# Patient Record
Sex: FEMALE | URBAN - METROPOLITAN AREA
[De-identification: names, ages, dates, MRNs, and addresses within clinical notes are randomized per-mention and may not be internally consistent; named-entity substitution may affect disease eponyms.]

---

## 2019-12-09 ENCOUNTER — HOSPITAL ENCOUNTER (OUTPATIENT)
Dept: RADIOLOGY | Facility: MEDICAL CENTER | Age: 64
End: 2019-12-09

## 2019-12-16 ENCOUNTER — TELEMEDICINE2 (OUTPATIENT)
Dept: PULMONOLOGY | Facility: HOSPICE | Age: 64
End: 2019-12-16
Payer: MEDICARE

## 2019-12-16 VITALS
TEMPERATURE: 98.7 F | DIASTOLIC BLOOD PRESSURE: 94 MMHG | SYSTOLIC BLOOD PRESSURE: 115 MMHG | HEIGHT: 61 IN | BODY MASS INDEX: 12.84 KG/M2 | HEART RATE: 74 BPM | WEIGHT: 68 LBS | RESPIRATION RATE: 16 BRPM | OXYGEN SATURATION: 98 %

## 2019-12-16 DIAGNOSIS — J18.9 PNEUMONIA OF RIGHT MIDDLE LOBE DUE TO INFECTIOUS ORGANISM: ICD-10-CM

## 2019-12-16 DIAGNOSIS — C34.91 MALIGNANT NEOPLASM OF RIGHT LUNG, UNSPECIFIED PART OF LUNG (HCC): ICD-10-CM

## 2019-12-16 DIAGNOSIS — R09.02 HYPOXIA: ICD-10-CM

## 2019-12-16 DIAGNOSIS — J44.9 CHRONIC OBSTRUCTIVE PULMONARY DISEASE, UNSPECIFIED COPD TYPE (HCC): ICD-10-CM

## 2019-12-16 PROCEDURE — 99204 OFFICE O/P NEW MOD 45 MIN: CPT | Performed by: INTERNAL MEDICINE

## 2019-12-16 RX ORDER — DIVALPROEX SODIUM 500 MG/1
500 TABLET, DELAYED RELEASE ORAL 3 TIMES DAILY
COMMUNITY

## 2019-12-16 NOTE — PATIENT INSTRUCTIONS
Please note that patient was evaluated by telemedicine at a remote site.  All examination and evaluation of the patient and information gathering were done jointly by the requesting physician at the site, and medical assistant, via licensed and securely encrypted tele-video communication with the assistance of a trained tele-presenter at the originating site.  As such my assessments and recommendations are limited as far as such telecommunication allows.  The patient is located in [facility], and the medical assistant helping with the physical examination and history is Haleigh.    This lady is evaluated by telemedicine in [place], referred by her primary care nurse practitioner, Mary Gallagher, because of severe COPD, hypoxemia and recent hospitalization with pneumonia.    She has a history in 2008 of having right lung cancer removed, she indicates one third of her lung was taken out.  I reviewed her x-ray that was available done in December of this year and it does show the right lung surgery volume loss and chronic changes are noted by my review with kyphoscoliosis, hyperinflation, but no suspicion of new malignancy or acute process.  Pneumonia clinically resolved.    Besides the lung cancer, smoking history is noted and she has cut down to 1/2 pack/day, this is strongly counseled and encouraged.  When she gets nervous or anxious she smokes but is trying to stop.    COPD was treated with Advair and albuterol, but Advair gives her a bad taste in her mouth and she has oral thrush by medical assistant exam, I would suggest she change to Stiolto or Anoro and avoid the inhaled steroid.  Albuterol rescue is used rarely.  Oxygen nighttime and with exercise.    She does have musculoskeletal chest pain, does have chronic back pain, but if that worsens she should go to urgent care or a local facility as she lives in [place].    I would suggest changing her to a nonsteroid long-acting inhaler such as Anoro or Stiolto, continue  oxygen at night and with daytime activity, stop smoking, flu vaccine and pneumonia vaccine are current, and finally periodic follow-up in her local area is needed, we would be happy to see her again if new problems arise or if imaging abnormalities are discovered.  We appreciate assisting in her care

## 2019-12-16 NOTE — PROGRESS NOTES
Silvia Hdz is a 64 y.o. female here for COPD with recent hospitalization and hypoxemia with lung cancer and prior right lung resectional surgery. Patient was referred by her primary care specialist in [place].    History of Present Illness:      Please note that patient was evaluated by telemedicine at a remote site.  All examination and evaluation of the patient and information gathering were done jointly by the requesting physician at the site, and medical assistant, via licensed and securely encrypted tele-video communication with the assistance of a trained tele-presenter at the originating site.  As such my assessments and recommendations are limited as far as such telecommunication allows.  The patient is located in [facility], and the medical assistant helping with the physical examination and history is Haleigh.    This lady is evaluated by telemedicine in [place], referred by her primary care nurse practitioner, Mary Gallagher, because of severe COPD, hypoxemia and recent hospitalization with pneumonia.    She has a history in 2008 of having right lung cancer removed, she indicates one third of her lung was taken out.  I reviewed her x-ray that was available done in December of this year and it does show the right lung surgery volume loss and chronic changes are noted by my review with kyphoscoliosis, hyperinflation, but no suspicion of new malignancy or acute process.  Pneumonia clinically resolved.    Besides the lung cancer, smoking history is noted and she has cut down to 1/2 pack/day, this is strongly counseled and encouraged.  When she gets nervous or anxious she smokes but is trying to stop.    COPD was treated with Advair and albuterol, but Advair gives her a bad taste in her mouth and she has oral thrush by medical assistant exam, I would suggest she change to Stiolto or Anoro and avoid the inhaled steroid.  Albuterol rescue is used rarely.  Oxygen nighttime and with exercise.    She does have  musculoskeletal chest pain, does have chronic back pain, but if that worsens she should go to urgent care or a local facility as she lives in [place].    I would suggest changing her to a nonsteroid long-acting inhaler such as Anoro or Stiolto, continue oxygen at night and with daytime activity, stop smoking, flu vaccine and pneumonia vaccine are current, and finally periodic follow-up in her local area is needed, we would be happy to see her again if new problems arise or if imaging abnormalities are discovered.  We appreciate assisting in her care    Constitutional ROS: Markedly reduced weight with body mass index only 13, urged to eat what ever appeals to her  Eyes: No change in vision or blurring or double vision  Mouth/Throat ROS: No sore throat, No recent change in voice or hoarseness; complains of bad taste in her mouth from Advair, with oral thrush will change to nonsteroid inhaler such as Anoro or Stiolto  Pulmonary ROS: See present history for pertinent positives  Cardiovascular ROS: No chest pain to suggest acute coronary syndrome; does have chest wall musculoskeletal discomfort, advised to seek local attention if worsens  Gastrointestinal ROS: No abdominal pain to suggest peptic disease; poor appetite  Musculoskeletal/Extremities ROS: no acute artritis or unusual swelling  Hematologic/Lymphatic ROS: No easy bleeding or unusual lymph node swelling  Neurologic ROS: No new or unusual weakness  Psychiatric ROS: No hallucinations  Allergic/Immunologic: No  urticaria or allergic rash      Current Outpatient Medications   Medication Sig Dispense Refill   • divalproex (DEPAKOTE) 500 MG Tablet Delayed Response Take 500 mg by mouth 3 times a day.     • carBAMazepine (TEGRETOL PO) Take  by mouth.       No current facility-administered medications for this visit.        Social History     Tobacco Use   • Smoking status: Current Every Day Smoker     Packs/day: 1.00     Types: Cigarettes   • Smokeless tobacco: Never Used  "  Substance Use Topics   • Alcohol use: Not Currently   • Drug use: Never        Past Medical History:   Diagnosis Date   • Cough    • Painful breathing    • Shortness of breath    • Wheezing        History reviewed. No pertinent surgical history.    Allergies: Patient has no allergy information on record.    History reviewed. No pertinent family history.    Physical Examination    Vitals:    12/16/19 1030   Height: 1.549 m (5' 1\")   Weight: 30.8 kg (68 lb)   Weight % change since last entry.: 0 %   BP: 115/94   Pulse: 74   BMI (Calculated): 12.85   Resp: 16   Temp: 37.1 °C (98.7 °F)       General Appearance: alert, no distress  Skin: Skin color, texture, turgor normal. No rashes or lesions.  Eyes: negative  Oropharynx: Lips, mucosa, and tongue normal. Teeth and gums normal. Oropharynx moist and without lesion  Lungs: positive findings: By telemedicine exam remarkably quiet and clear  Heart: negative. RRR without murmur, gallop, or rubs.  No ectopy.  Abdomen: Abdomen soft, non-tender. . No masses,  No organomegaly  Extremities:  No deformities, edema, or skin discoloration; she indicates intermittent ankle swelling but none evident today by medical assistant exam  Joints: No acute arthritis  Peripheral Pulses:perfused  Neurologic: intact grossly  Oral thrush by medical assistant exam, advised to use nystatin swish and swallow and stop Advair      Imaging: Recent chest x-ray done December 9, 2019 shows prior right lung surgery, surgical clip evident, volume loss on the right side with kyphoscoliosis and hyperinflation, chronic changes identified by the radiologist as well as my personal review    PFTS: None presently      Assessment and Plan  1. Chronic obstructive pulmonary disease, unspecified COPD type (HCC)  Stop Advair, utilize Anoro or Stiolto see discussion above    2. Hypoxia  Oxygen nighttime and daytime with activity    3. Malignant neoplasm of right lung, unspecified part of lung (HCC)  Remote " surgery    4. Pneumonia of right middle lobe due to infectious organism (HCC)  Recently hospitalized in [place]      Followup Return if symptoms worsen or fail to improve.

## 2021-04-10 ENCOUNTER — HOSPITAL ENCOUNTER (INPATIENT)
Dept: HOSPITAL 8 - ED | Age: 66
LOS: 3 days | Discharge: NURSING, REHAB OR LONG-TERM CARE | DRG: 480 | End: 2021-04-13
Attending: INTERNAL MEDICINE | Admitting: FAMILY MEDICINE
Payer: MEDICARE

## 2021-04-10 VITALS — DIASTOLIC BLOOD PRESSURE: 83 MMHG | SYSTOLIC BLOOD PRESSURE: 145 MMHG

## 2021-04-10 VITALS — DIASTOLIC BLOOD PRESSURE: 63 MMHG | SYSTOLIC BLOOD PRESSURE: 108 MMHG

## 2021-04-10 VITALS — SYSTOLIC BLOOD PRESSURE: 109 MMHG | DIASTOLIC BLOOD PRESSURE: 58 MMHG

## 2021-04-10 VITALS — BODY MASS INDEX: 13.82 KG/M2 | WEIGHT: 73.19 LBS | HEIGHT: 61 IN

## 2021-04-10 DIAGNOSIS — J96.11: ICD-10-CM

## 2021-04-10 DIAGNOSIS — I25.10: ICD-10-CM

## 2021-04-10 DIAGNOSIS — Z99.81: ICD-10-CM

## 2021-04-10 DIAGNOSIS — Z20.822: ICD-10-CM

## 2021-04-10 DIAGNOSIS — Z79.82: ICD-10-CM

## 2021-04-10 DIAGNOSIS — Z86.711: ICD-10-CM
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
## 2021-04-10 DIAGNOSIS — S72.002A: Primary | ICD-10-CM

## 2021-04-10 DIAGNOSIS — Z88.8: ICD-10-CM

## 2021-04-10 DIAGNOSIS — I11.0: ICD-10-CM
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
## 2021-04-10 DIAGNOSIS — Z88.0: ICD-10-CM

## 2021-04-10 DIAGNOSIS — Z90.710: ICD-10-CM

## 2021-04-10 DIAGNOSIS — Z95.5: ICD-10-CM

## 2021-04-10 DIAGNOSIS — E43: ICD-10-CM

## 2021-04-10 DIAGNOSIS — I25.2: ICD-10-CM

## 2021-04-10 DIAGNOSIS — G25.81: ICD-10-CM
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
## 2021-04-10 DIAGNOSIS — G40.909: ICD-10-CM

## 2021-04-10 DIAGNOSIS — I50.9: ICD-10-CM

## 2021-04-10 DIAGNOSIS — J43.9: ICD-10-CM

## 2021-04-10 PROCEDURE — 71045 X-RAY EXAM CHEST 1 VIEW: CPT

## 2021-04-10 PROCEDURE — 96375 TX/PRO/DX INJ NEW DRUG ADDON: CPT

## 2021-04-10 PROCEDURE — 76000 FLUOROSCOPY <1 HR PHYS/QHP: CPT

## 2021-04-10 PROCEDURE — 83735 ASSAY OF MAGNESIUM: CPT

## 2021-04-10 PROCEDURE — C1713 ANCHOR/SCREW BN/BN,TIS/BN: HCPCS

## 2021-04-10 PROCEDURE — 0QS736Z REPOSITION LEFT UPPER FEMUR WITH INTRAMEDULLARY INTERNAL FIXATION DEVICE, PERCUTANEOUS APPROACH: ICD-10-PCS | Performed by: STUDENT IN AN ORGANIZED HEALTH CARE EDUCATION/TRAINING PROGRAM

## 2021-04-10 PROCEDURE — 36415 COLL VENOUS BLD VENIPUNCTURE: CPT

## 2021-04-10 PROCEDURE — 93005 ELECTROCARDIOGRAM TRACING: CPT

## 2021-04-10 PROCEDURE — 85025 COMPLETE CBC W/AUTO DIFF WBC: CPT

## 2021-04-10 PROCEDURE — 80048 BASIC METABOLIC PNL TOTAL CA: CPT

## 2021-04-10 PROCEDURE — 94640 AIRWAY INHALATION TREATMENT: CPT

## 2021-04-10 PROCEDURE — 87635 SARS-COV-2 COVID-19 AMP PRB: CPT

## 2021-04-10 PROCEDURE — 96374 THER/PROPH/DIAG INJ IV PUSH: CPT

## 2021-04-10 PROCEDURE — 82040 ASSAY OF SERUM ALBUMIN: CPT

## 2021-04-10 PROCEDURE — 84100 ASSAY OF PHOSPHORUS: CPT

## 2021-04-10 PROCEDURE — C1769 GUIDE WIRE: HCPCS

## 2021-04-10 RX ADMIN — SODIUM CHLORIDE, SODIUM LACTATE, POTASSIUM CHLORIDE, AND CALCIUM CHLORIDE SCH MLS/HR: .6; .31; .03; .02 INJECTION, SOLUTION INTRAVENOUS at 13:00

## 2021-04-10 RX ADMIN — HYDROMORPHONE HYDROCHLORIDE PRN MG: 1 INJECTION, SOLUTION INTRAMUSCULAR; INTRAVENOUS; SUBCUTANEOUS at 07:04

## 2021-04-10 RX ADMIN — HYDROMORPHONE HYDROCHLORIDE PRN MG: 1 INJECTION, SOLUTION INTRAMUSCULAR; INTRAVENOUS; SUBCUTANEOUS at 07:37

## 2021-04-10 RX ADMIN — FLUTICASONE FUROATE AND VILANTEROL TRIFENATATE SCH PUFF: 100; 25 POWDER RESPIRATORY (INHALATION) at 09:00

## 2021-04-10 RX ADMIN — SODIUM CHLORIDE, SODIUM LACTATE, POTASSIUM CHLORIDE, AND CALCIUM CHLORIDE SCH MLS/HR: .6; .31; .03; .02 INJECTION, SOLUTION INTRAVENOUS at 21:45

## 2021-04-10 RX ADMIN — LISINOPRIL SCH MG: 5 TABLET ORAL at 09:00

## 2021-04-10 RX ADMIN — ASPIRIN SCH MG: 81 TABLET, COATED ORAL at 08:30

## 2021-04-10 RX ADMIN — ATORVASTATIN CALCIUM SCH MG: 40 TABLET, FILM COATED ORAL at 20:19

## 2021-04-10 RX ADMIN — MORPHINE SULFATE PRN MG: 10 INJECTION INTRAVENOUS at 12:31

## 2021-04-10 RX ADMIN — EZETIMIBE SCH MG: 10 TABLET ORAL at 20:19

## 2021-04-10 RX ADMIN — METOPROLOL TARTRATE SCH MG: 25 TABLET, FILM COATED ORAL at 09:00

## 2021-04-10 RX ADMIN — METOPROLOL TARTRATE SCH MG: 25 TABLET, FILM COATED ORAL at 20:20

## 2021-04-10 RX ADMIN — ACETAMINOPHEN PRN MG: 325 TABLET, FILM COATED ORAL at 18:45

## 2021-04-10 RX ADMIN — MORPHINE SULFATE PRN MG: 10 INJECTION INTRAVENOUS at 15:55

## 2021-04-10 RX ADMIN — SPIRONOLACTONE SCH MG: 25 TABLET ORAL at 20:20

## 2021-04-10 RX ADMIN — CARBAMAZEPINE SCH MG: 200 TABLET ORAL at 20:20

## 2021-04-10 RX ADMIN — DOCUSATE SODIUM 50MG AND SENNOSIDES 8.6MG SCH TAB: 8.6; 5 TABLET, FILM COATED ORAL at 09:00

## 2021-04-10 RX ADMIN — DIVALPROEX SODIUM SCH MG: 500 TABLET, DELAYED RELEASE ORAL at 20:19

## 2021-04-10 NOTE — NUR
INITIAL PT CONTACT. PT PRESENTS TO ED FROM [place] AS TRANSFER PT FOR FX OF THE 
LEFT FEMORAL NECK, TRANSFER FOR ORTHO TX. PT STATES SHE FELL LAST NIGHT AND 
EXPERIENCED IMMEDIATE LEFT HIP PAIN. NO LOSS OF CONCIOUSNESS OR NECK PAIN. [facility] 
DISTAL TO INJURY INTACT. PT TRANSFERED FROM EMS [facility] TO ED [facility], 
REPOSITIONED PER REQUEST. PT DENIES ANY ADDITIONAL NEEDS AT THIS TIME. CALL 
LIGHT AND PERSONAL BELONGINGS WITHIN REACH. ERP AT BEDSIDE. WILL MEDICATE PER 
EMAR.

## 2021-04-11 VITALS — DIASTOLIC BLOOD PRESSURE: 60 MMHG | SYSTOLIC BLOOD PRESSURE: 96 MMHG

## 2021-04-11 VITALS — SYSTOLIC BLOOD PRESSURE: 102 MMHG | DIASTOLIC BLOOD PRESSURE: 61 MMHG

## 2021-04-11 VITALS — SYSTOLIC BLOOD PRESSURE: 101 MMHG | DIASTOLIC BLOOD PRESSURE: 61 MMHG

## 2021-04-11 VITALS — DIASTOLIC BLOOD PRESSURE: 66 MMHG | SYSTOLIC BLOOD PRESSURE: 107 MMHG

## 2021-04-11 VITALS — SYSTOLIC BLOOD PRESSURE: 105 MMHG | DIASTOLIC BLOOD PRESSURE: 56 MMHG

## 2021-04-11 LAB
ALBUMIN SERPL-MCNC: 2.3 G/DL (ref 3.4–5)
ANION GAP SERPL CALC-SCNC: 4 MMOL/L (ref 5–15)
BASOPHILS # BLD AUTO: 0 X10^3/UL (ref 0–0.1)
BASOPHILS NFR BLD AUTO: 0 % (ref 0–1)
CALCIUM SERPL-MCNC: 7.8 MG/DL (ref 8.5–10.1)
CHLORIDE SERPL-SCNC: 106 MMOL/L (ref 98–107)
CREAT SERPL-MCNC: 0.62 MG/DL (ref 0.55–1.02)
EOSINOPHIL # BLD AUTO: 0.1 X10^3/UL (ref 0–0.4)
EOSINOPHIL NFR BLD AUTO: 1 % (ref 1–7)
ERYTHROCYTE [DISTWIDTH] IN BLOOD BY AUTOMATED COUNT: 15.4 % (ref 9.6–15.2)
LYMPHOCYTES # BLD AUTO: 1.7 X10^3/UL (ref 1–3.4)
LYMPHOCYTES NFR BLD AUTO: 20 % (ref 22–44)
MCH RBC QN AUTO: 25.4 PG (ref 27–34.8)
MCHC RBC AUTO-ENTMCNC: 31.6 G/DL (ref 32.4–35.8)
MD: NO
MONOCYTES # BLD AUTO: 0.9 X10^3/UL (ref 0.2–0.8)
MONOCYTES NFR BLD AUTO: 10 % (ref 2–9)
NEUTROPHILS # BLD AUTO: 6.1 X10^3/UL (ref 1.8–6.8)
NEUTROPHILS NFR BLD AUTO: 69 % (ref 42–75)
PLATELET # BLD AUTO: 189 X10^3/UL (ref 130–400)
PMV BLD AUTO: 7 FL (ref 7.4–10.4)
RBC # BLD AUTO: 3.23 X10^6/UL (ref 3.82–5.3)

## 2021-04-11 RX ADMIN — LISINOPRIL SCH MG: 5 TABLET ORAL at 09:04

## 2021-04-11 RX ADMIN — METOPROLOL TARTRATE SCH MG: 25 TABLET, FILM COATED ORAL at 20:55

## 2021-04-11 RX ADMIN — HEPARIN SODIUM SCH UNITS: 5000 INJECTION, SOLUTION INTRAVENOUS; SUBCUTANEOUS at 16:50

## 2021-04-11 RX ADMIN — CARBAMAZEPINE SCH MG: 200 TABLET ORAL at 20:56

## 2021-04-11 RX ADMIN — Medication SCH TAB: at 16:50

## 2021-04-11 RX ADMIN — OXYCODONE HYDROCHLORIDE AND ACETAMINOPHEN SCH MG: 500 TABLET ORAL at 16:49

## 2021-04-11 RX ADMIN — HYDROCODONE BITARTRATE AND ACETAMINOPHEN PRN TAB: 5; 325 TABLET ORAL at 06:33

## 2021-04-11 RX ADMIN — DIVALPROEX SODIUM SCH MG: 500 TABLET, DELAYED RELEASE ORAL at 09:05

## 2021-04-11 RX ADMIN — MORPHINE SULFATE PRN MG: 10 INJECTION INTRAVENOUS at 23:44

## 2021-04-11 RX ADMIN — FLUTICASONE FUROATE AND VILANTEROL TRIFENATATE SCH PUFF: 100; 25 POWDER RESPIRATORY (INHALATION) at 09:12

## 2021-04-11 RX ADMIN — HYDROCODONE BITARTRATE AND ACETAMINOPHEN PRN TAB: 5; 325 TABLET ORAL at 13:04

## 2021-04-11 RX ADMIN — TICAGRELOR SCH MG: 90 TABLET ORAL at 20:55

## 2021-04-11 RX ADMIN — MORPHINE SULFATE PRN MG: 10 INJECTION INTRAVENOUS at 00:50

## 2021-04-11 RX ADMIN — CARBAMAZEPINE SCH MG: 200 TABLET ORAL at 09:06

## 2021-04-11 RX ADMIN — FLUTICASONE FUROATE AND VILANTEROL TRIFENATATE SCH PUFF: 100; 25 POWDER RESPIRATORY (INHALATION) at 08:20

## 2021-04-11 RX ADMIN — METOPROLOL TARTRATE SCH MG: 25 TABLET, FILM COATED ORAL at 09:00

## 2021-04-11 RX ADMIN — EZETIMIBE SCH MG: 10 TABLET ORAL at 20:55

## 2021-04-11 RX ADMIN — DOCUSATE SODIUM 50MG AND SENNOSIDES 8.6MG SCH TAB: 8.6; 5 TABLET, FILM COATED ORAL at 09:05

## 2021-04-11 RX ADMIN — HYDROCODONE BITARTRATE AND ACETAMINOPHEN PRN TAB: 5; 325 TABLET ORAL at 18:49

## 2021-04-11 RX ADMIN — ASPIRIN SCH MG: 81 TABLET, COATED ORAL at 06:33

## 2021-04-11 RX ADMIN — SPIRONOLACTONE SCH MG: 25 TABLET ORAL at 20:56

## 2021-04-11 RX ADMIN — ATORVASTATIN CALCIUM SCH MG: 40 TABLET, FILM COATED ORAL at 20:55

## 2021-04-11 RX ADMIN — HEPARIN SODIUM SCH UNITS: 5000 INJECTION, SOLUTION INTRAVENOUS; SUBCUTANEOUS at 09:09

## 2021-04-11 RX ADMIN — DIVALPROEX SODIUM SCH MG: 500 TABLET, DELAYED RELEASE ORAL at 20:56

## 2021-04-11 RX ADMIN — CEFAZOLIN SODIUM SCH MLS/HR: 1 SOLUTION INTRAVENOUS at 16:50

## 2021-04-12 VITALS — DIASTOLIC BLOOD PRESSURE: 68 MMHG | SYSTOLIC BLOOD PRESSURE: 108 MMHG

## 2021-04-12 VITALS — DIASTOLIC BLOOD PRESSURE: 61 MMHG | SYSTOLIC BLOOD PRESSURE: 101 MMHG

## 2021-04-12 VITALS — SYSTOLIC BLOOD PRESSURE: 114 MMHG | DIASTOLIC BLOOD PRESSURE: 68 MMHG

## 2021-04-12 VITALS — DIASTOLIC BLOOD PRESSURE: 59 MMHG | SYSTOLIC BLOOD PRESSURE: 105 MMHG

## 2021-04-12 VITALS — DIASTOLIC BLOOD PRESSURE: 71 MMHG | SYSTOLIC BLOOD PRESSURE: 112 MMHG

## 2021-04-12 VITALS — SYSTOLIC BLOOD PRESSURE: 100 MMHG | DIASTOLIC BLOOD PRESSURE: 57 MMHG

## 2021-04-12 LAB
ANION GAP SERPL CALC-SCNC: 3 MMOL/L (ref 5–15)
BASOPHILS # BLD AUTO: 0 X10^3/UL (ref 0–0.1)
BASOPHILS NFR BLD AUTO: 1 % (ref 0–1)
CALCIUM SERPL-MCNC: 8.3 MG/DL (ref 8.5–10.1)
CHLORIDE SERPL-SCNC: 106 MMOL/L (ref 98–107)
CREAT SERPL-MCNC: 0.39 MG/DL (ref 0.55–1.02)
EOSINOPHIL # BLD AUTO: 0.1 X10^3/UL (ref 0–0.4)
EOSINOPHIL NFR BLD AUTO: 1 % (ref 1–7)
ERYTHROCYTE [DISTWIDTH] IN BLOOD BY AUTOMATED COUNT: 15.9 % (ref 9.6–15.2)
LYMPHOCYTES # BLD AUTO: 1.4 X10^3/UL (ref 1–3.4)
LYMPHOCYTES NFR BLD AUTO: 17 % (ref 22–44)
MCH RBC QN AUTO: 25.4 PG (ref 27–34.8)
MCHC RBC AUTO-ENTMCNC: 31.9 G/DL (ref 32.4–35.8)
MD: NO
MONOCYTES # BLD AUTO: 0.6 X10^3/UL (ref 0.2–0.8)
MONOCYTES NFR BLD AUTO: 7 % (ref 2–9)
NEUTROPHILS # BLD AUTO: 6.3 X10^3/UL (ref 1.8–6.8)
NEUTROPHILS NFR BLD AUTO: 74 % (ref 42–75)
PLATELET # BLD AUTO: 187 X10^3/UL (ref 130–400)
PMV BLD AUTO: 7.6 FL (ref 7.4–10.4)
RBC # BLD AUTO: 3.02 X10^6/UL (ref 3.82–5.3)

## 2021-04-12 RX ADMIN — HEPARIN SODIUM SCH UNITS: 5000 INJECTION, SOLUTION INTRAVENOUS; SUBCUTANEOUS at 14:52

## 2021-04-12 RX ADMIN — DIVALPROEX SODIUM SCH MG: 500 TABLET, DELAYED RELEASE ORAL at 08:51

## 2021-04-12 RX ADMIN — DULOXETINE HYDROCHLORIDE SCH MG: 30 CAPSULE, DELAYED RELEASE ORAL at 08:25

## 2021-04-12 RX ADMIN — HYDROCODONE BITARTRATE AND ACETAMINOPHEN PRN TAB: 5; 325 TABLET ORAL at 06:07

## 2021-04-12 RX ADMIN — TICAGRELOR SCH MG: 90 TABLET ORAL at 21:15

## 2021-04-12 RX ADMIN — SPIRONOLACTONE SCH MG: 25 TABLET ORAL at 21:15

## 2021-04-12 RX ADMIN — ASPIRIN SCH MG: 81 TABLET, COATED ORAL at 06:06

## 2021-04-12 RX ADMIN — HEPARIN SODIUM SCH UNITS: 5000 INJECTION, SOLUTION INTRAVENOUS; SUBCUTANEOUS at 21:18

## 2021-04-12 RX ADMIN — OXYCODONE HYDROCHLORIDE AND ACETAMINOPHEN SCH MG: 500 TABLET ORAL at 18:05

## 2021-04-12 RX ADMIN — ATORVASTATIN CALCIUM SCH MG: 40 TABLET, FILM COATED ORAL at 21:15

## 2021-04-12 RX ADMIN — ACETAMINOPHEN PRN MG: 325 TABLET, FILM COATED ORAL at 13:02

## 2021-04-12 RX ADMIN — TICAGRELOR SCH MG: 90 TABLET ORAL at 08:27

## 2021-04-12 RX ADMIN — PANTOPRAZOLE SODIUM SCH MG: 40 TABLET, DELAYED RELEASE ORAL at 08:23

## 2021-04-12 RX ADMIN — OXYCODONE HYDROCHLORIDE AND ACETAMINOPHEN SCH MG: 500 TABLET ORAL at 08:27

## 2021-04-12 RX ADMIN — CARBAMAZEPINE SCH MG: 200 TABLET ORAL at 21:15

## 2021-04-12 RX ADMIN — Medication SCH TAB: at 08:25

## 2021-04-12 RX ADMIN — HEPARIN SODIUM SCH UNITS: 5000 INJECTION, SOLUTION INTRAVENOUS; SUBCUTANEOUS at 06:06

## 2021-04-12 RX ADMIN — ACETAMINOPHEN PRN MG: 325 TABLET, FILM COATED ORAL at 21:15

## 2021-04-12 RX ADMIN — METOPROLOL TARTRATE SCH MG: 25 TABLET, FILM COATED ORAL at 08:26

## 2021-04-12 RX ADMIN — METOPROLOL TARTRATE SCH MG: 25 TABLET, FILM COATED ORAL at 21:16

## 2021-04-12 RX ADMIN — CARBAMAZEPINE SCH MG: 200 TABLET ORAL at 08:27

## 2021-04-12 RX ADMIN — CEFAZOLIN SODIUM SCH MLS/HR: 1 SOLUTION INTRAVENOUS at 00:30

## 2021-04-12 RX ADMIN — DOCUSATE SODIUM 50MG AND SENNOSIDES 8.6MG SCH TAB: 8.6; 5 TABLET, FILM COATED ORAL at 08:22

## 2021-04-12 RX ADMIN — LISINOPRIL SCH MG: 5 TABLET ORAL at 08:29

## 2021-04-12 RX ADMIN — FLUTICASONE FUROATE AND VILANTEROL TRIFENATATE SCH PUFF: 100; 25 POWDER RESPIRATORY (INHALATION) at 08:28

## 2021-04-12 RX ADMIN — EZETIMIBE SCH MG: 10 TABLET ORAL at 21:16

## 2021-04-12 RX ADMIN — DIVALPROEX SODIUM SCH MG: 500 TABLET, DELAYED RELEASE ORAL at 21:00

## 2021-04-13 VITALS — SYSTOLIC BLOOD PRESSURE: 104 MMHG | DIASTOLIC BLOOD PRESSURE: 66 MMHG

## 2021-04-13 VITALS — DIASTOLIC BLOOD PRESSURE: 67 MMHG | SYSTOLIC BLOOD PRESSURE: 110 MMHG

## 2021-04-13 LAB
ANION GAP SERPL CALC-SCNC: 4 MMOL/L (ref 5–15)
BASOPHILS # BLD AUTO: 0 X10^3/UL (ref 0–0.1)
BASOPHILS NFR BLD AUTO: 1 % (ref 0–1)
CALCIUM SERPL-MCNC: 8.4 MG/DL (ref 8.5–10.1)
CHLORIDE SERPL-SCNC: 104 MMOL/L (ref 98–107)
CREAT SERPL-MCNC: 0.35 MG/DL (ref 0.55–1.02)
EOSINOPHIL # BLD AUTO: 0.1 X10^3/UL (ref 0–0.4)
EOSINOPHIL NFR BLD AUTO: 1 % (ref 1–7)
ERYTHROCYTE [DISTWIDTH] IN BLOOD BY AUTOMATED COUNT: 15.8 % (ref 9.6–15.2)
LYMPHOCYTES # BLD AUTO: 1.2 X10^3/UL (ref 1–3.4)
LYMPHOCYTES NFR BLD AUTO: 23 % (ref 22–44)
MCH RBC QN AUTO: 25.3 PG (ref 27–34.8)
MCHC RBC AUTO-ENTMCNC: 31.5 G/DL (ref 32.4–35.8)
MD: NO
MONOCYTES # BLD AUTO: 0.4 X10^3/UL (ref 0.2–0.8)
MONOCYTES NFR BLD AUTO: 7 % (ref 2–9)
NEUTROPHILS # BLD AUTO: 3.6 X10^3/UL (ref 1.8–6.8)
NEUTROPHILS NFR BLD AUTO: 68 % (ref 42–75)
PLATELET # BLD AUTO: 182 X10^3/UL (ref 130–400)
PMV BLD AUTO: 7.3 FL (ref 7.4–10.4)
RBC # BLD AUTO: 3.16 X10^6/UL (ref 3.82–5.3)

## 2021-04-13 RX ADMIN — HEPARIN SODIUM SCH UNITS: 5000 INJECTION, SOLUTION INTRAVENOUS; SUBCUTANEOUS at 05:56

## 2021-04-13 RX ADMIN — TICAGRELOR SCH MG: 90 TABLET ORAL at 09:09

## 2021-04-13 RX ADMIN — OXYCODONE HYDROCHLORIDE AND ACETAMINOPHEN SCH MG: 500 TABLET ORAL at 09:08

## 2021-04-13 RX ADMIN — DULOXETINE HYDROCHLORIDE SCH MG: 30 CAPSULE, DELAYED RELEASE ORAL at 09:08

## 2021-04-13 RX ADMIN — CARBAMAZEPINE SCH MG: 200 TABLET ORAL at 09:09

## 2021-04-13 RX ADMIN — DIVALPROEX SODIUM SCH MG: 500 TABLET, DELAYED RELEASE ORAL at 09:09

## 2021-04-13 RX ADMIN — Medication SCH TAB: at 09:08

## 2021-04-13 RX ADMIN — FLUTICASONE FUROATE AND VILANTEROL TRIFENATATE SCH PUFF: 100; 25 POWDER RESPIRATORY (INHALATION) at 07:30

## 2021-04-13 RX ADMIN — METOPROLOL TARTRATE SCH MG: 25 TABLET, FILM COATED ORAL at 09:08

## 2021-04-13 RX ADMIN — ASPIRIN SCH MG: 81 TABLET, COATED ORAL at 05:56

## 2021-04-13 RX ADMIN — PANTOPRAZOLE SODIUM SCH MG: 40 TABLET, DELAYED RELEASE ORAL at 05:56

## 2021-04-13 RX ADMIN — DOCUSATE SODIUM 50MG AND SENNOSIDES 8.6MG SCH TAB: 8.6; 5 TABLET, FILM COATED ORAL at 09:08

## 2021-04-13 RX ADMIN — LISINOPRIL SCH MG: 5 TABLET ORAL at 09:08
